# Patient Record
Sex: MALE | Race: BLACK OR AFRICAN AMERICAN | NOT HISPANIC OR LATINO | Employment: UNEMPLOYED | URBAN - METROPOLITAN AREA
[De-identification: names, ages, dates, MRNs, and addresses within clinical notes are randomized per-mention and may not be internally consistent; named-entity substitution may affect disease eponyms.]

---

## 2020-02-29 ENCOUNTER — HOSPITAL ENCOUNTER (EMERGENCY)
Facility: HOSPITAL | Age: 30
Discharge: HOME | End: 2020-02-29
Attending: EMERGENCY MEDICINE | Admitting: EMERGENCY MEDICINE
Payer: COMMERCIAL

## 2020-02-29 ENCOUNTER — APPOINTMENT (EMERGENCY)
Dept: RADIOLOGY | Facility: HOSPITAL | Age: 30
End: 2020-02-29
Payer: COMMERCIAL

## 2020-02-29 VITALS
SYSTOLIC BLOOD PRESSURE: 117 MMHG | RESPIRATION RATE: 18 BRPM | DIASTOLIC BLOOD PRESSURE: 86 MMHG | WEIGHT: 142.2 LBS | TEMPERATURE: 98.7 F | OXYGEN SATURATION: 100 % | HEART RATE: 87 BPM

## 2020-02-29 DIAGNOSIS — F22 PARANOIA (HCC): Primary | ICD-10-CM

## 2020-02-29 DIAGNOSIS — M54.50 LOW BACK PAIN: ICD-10-CM

## 2020-02-29 DIAGNOSIS — Z11.3 SCREEN FOR STD (SEXUALLY TRANSMITTED DISEASE): ICD-10-CM

## 2020-02-29 LAB
ALBUMIN SERPL BCP-MCNC: 3.9 G/DL (ref 3.5–5)
ALP SERPL-CCNC: 78 U/L (ref 46–116)
ALT SERPL W P-5'-P-CCNC: 14 U/L (ref 12–78)
AMPHETAMINES SERPL QL SCN: NEGATIVE
ANION GAP SERPL CALCULATED.3IONS-SCNC: 9 MMOL/L (ref 4–13)
AST SERPL W P-5'-P-CCNC: 13 U/L (ref 5–45)
BACTERIA UR QL AUTO: ABNORMAL /HPF
BARBITURATES UR QL: NEGATIVE
BASOPHILS # BLD AUTO: 0.01 THOUSANDS/ΜL (ref 0–0.1)
BASOPHILS NFR BLD AUTO: 0 % (ref 0–1)
BENZODIAZ UR QL: NEGATIVE
BILIRUB SERPL-MCNC: 0.5 MG/DL (ref 0.2–1)
BILIRUB UR QL STRIP: NEGATIVE
BUN SERPL-MCNC: 14 MG/DL (ref 5–25)
CALCIUM SERPL-MCNC: 9.2 MG/DL (ref 8.3–10.1)
CHLORIDE SERPL-SCNC: 102 MMOL/L (ref 100–108)
CLARITY UR: CLEAR
CO2 SERPL-SCNC: 29 MMOL/L (ref 21–32)
COCAINE UR QL: NEGATIVE
COLOR UR: YELLOW
CREAT SERPL-MCNC: 1.32 MG/DL (ref 0.6–1.3)
EOSINOPHIL # BLD AUTO: 0 THOUSAND/ΜL (ref 0–0.61)
EOSINOPHIL NFR BLD AUTO: 0 % (ref 0–6)
ERYTHROCYTE [DISTWIDTH] IN BLOOD BY AUTOMATED COUNT: 11.9 % (ref 11.6–15.1)
ETHANOL SERPL-MCNC: <3 MG/DL (ref 0–3)
GFR SERPL CREATININE-BSD FRML MDRD: 83 ML/MIN/1.73SQ M
GLUCOSE SERPL-MCNC: 87 MG/DL (ref 65–140)
GLUCOSE UR STRIP-MCNC: ABNORMAL MG/DL
HAV IGM SER QL: NORMAL
HBV CORE AB SER QL: NORMAL
HBV CORE IGM SER QL: NORMAL
HBV CORE IGM SER QL: NORMAL
HBV SURFACE AG SER QL: NORMAL
HBV SURFACE AG SER QL: NORMAL
HCT VFR BLD AUTO: 45.9 % (ref 36.5–49.3)
HCV AB SER QL: NORMAL
HCV AB SER QL: NORMAL
HGB BLD-MCNC: 16.2 G/DL (ref 12–17)
HGB UR QL STRIP.AUTO: NEGATIVE
HIV 1+2 AB+HIV1 P24 AG SERPL QL IA: NORMAL
HIV1 P24 AG SER QL: NORMAL
IMM GRANULOCYTES # BLD AUTO: 0.03 THOUSAND/UL (ref 0–0.2)
IMM GRANULOCYTES NFR BLD AUTO: 0 % (ref 0–2)
KETONES UR STRIP-MCNC: NEGATIVE MG/DL
LEUKOCYTE ESTERASE UR QL STRIP: NEGATIVE
LYMPHOCYTES # BLD AUTO: 0.56 THOUSANDS/ΜL (ref 0.6–4.47)
LYMPHOCYTES NFR BLD AUTO: 6 % (ref 14–44)
MCH RBC QN AUTO: 32.1 PG (ref 26.8–34.3)
MCHC RBC AUTO-ENTMCNC: 35.3 G/DL (ref 31.4–37.4)
MCV RBC AUTO: 91 FL (ref 82–98)
METHADONE UR QL: NEGATIVE
MONOCYTES # BLD AUTO: 0.45 THOUSAND/ΜL (ref 0.17–1.22)
MONOCYTES NFR BLD AUTO: 5 % (ref 4–12)
MUCOUS THREADS UR QL AUTO: ABNORMAL
NEUTROPHILS # BLD AUTO: 8.38 THOUSANDS/ΜL (ref 1.85–7.62)
NEUTS SEG NFR BLD AUTO: 89 % (ref 43–75)
NITRITE UR QL STRIP: NEGATIVE
NON-SQ EPI CELLS URNS QL MICRO: ABNORMAL /HPF
NRBC BLD AUTO-RTO: 0 /100 WBCS
OPIATES UR QL SCN: NEGATIVE
PCP UR QL: NEGATIVE
PH UR STRIP.AUTO: 5.5 [PH]
PLATELET # BLD AUTO: 158 THOUSANDS/UL (ref 149–390)
PMV BLD AUTO: 10.7 FL (ref 8.9–12.7)
POTASSIUM SERPL-SCNC: 4 MMOL/L (ref 3.5–5.3)
PROT SERPL-MCNC: 8.3 G/DL (ref 6.4–8.2)
PROT UR STRIP-MCNC: ABNORMAL MG/DL
RBC # BLD AUTO: 5.05 MILLION/UL (ref 3.88–5.62)
RBC #/AREA URNS AUTO: ABNORMAL /HPF
SODIUM SERPL-SCNC: 140 MMOL/L (ref 136–145)
SP GR UR STRIP.AUTO: >=1.03 (ref 1–1.03)
THC UR QL: POSITIVE
UROBILINOGEN UR QL STRIP.AUTO: 0.2 E.U./DL
WBC # BLD AUTO: 9.43 THOUSAND/UL (ref 4.31–10.16)
WBC #/AREA URNS AUTO: ABNORMAL /HPF

## 2020-02-29 PROCEDURE — 80074 ACUTE HEPATITIS PANEL: CPT | Performed by: EMERGENCY MEDICINE

## 2020-02-29 PROCEDURE — 99284 EMERGENCY DEPT VISIT MOD MDM: CPT | Performed by: EMERGENCY MEDICINE

## 2020-02-29 PROCEDURE — 85025 COMPLETE CBC W/AUTO DIFF WBC: CPT | Performed by: EMERGENCY MEDICINE

## 2020-02-29 PROCEDURE — 80053 COMPREHEN METABOLIC PANEL: CPT | Performed by: EMERGENCY MEDICINE

## 2020-02-29 PROCEDURE — 87491 CHLMYD TRACH DNA AMP PROBE: CPT | Performed by: EMERGENCY MEDICINE

## 2020-02-29 PROCEDURE — 80320 DRUG SCREEN QUANTALCOHOLS: CPT | Performed by: EMERGENCY MEDICINE

## 2020-02-29 PROCEDURE — 99285 EMERGENCY DEPT VISIT HI MDM: CPT

## 2020-02-29 PROCEDURE — 72100 X-RAY EXAM L-S SPINE 2/3 VWS: CPT

## 2020-02-29 PROCEDURE — 36415 COLL VENOUS BLD VENIPUNCTURE: CPT | Performed by: EMERGENCY MEDICINE

## 2020-02-29 PROCEDURE — 87806 HIV AG W/HIV1&2 ANTB W/OPTIC: CPT | Performed by: EMERGENCY MEDICINE

## 2020-02-29 PROCEDURE — 87591 N.GONORRHOEAE DNA AMP PROB: CPT | Performed by: EMERGENCY MEDICINE

## 2020-02-29 PROCEDURE — 86592 SYPHILIS TEST NON-TREP QUAL: CPT | Performed by: EMERGENCY MEDICINE

## 2020-02-29 PROCEDURE — 80307 DRUG TEST PRSMV CHEM ANLYZR: CPT | Performed by: EMERGENCY MEDICINE

## 2020-02-29 PROCEDURE — 86704 HEP B CORE ANTIBODY TOTAL: CPT | Performed by: EMERGENCY MEDICINE

## 2020-02-29 PROCEDURE — 81001 URINALYSIS AUTO W/SCOPE: CPT | Performed by: EMERGENCY MEDICINE

## 2020-02-29 RX ORDER — IBUPROFEN 400 MG/1
400 TABLET ORAL ONCE
Status: COMPLETED | OUTPATIENT
Start: 2020-02-29 | End: 2020-02-29

## 2020-02-29 RX ADMIN — IBUPROFEN 400 MG: 400 TABLET ORAL at 02:40

## 2020-02-29 NOTE — ED NOTES
CW placed call to [address] [place] [address], spoke w/Patrizia  CW inquiring on outcome of phone interview  As per Eleno Keita, pt has been accepted and will be picked up between 4:30-5:00     CW provided pt w/update  Pt is receptive to plan      TDS, CW

## 2020-02-29 NOTE — ED NOTES
CW has placed 2 calls to [facility] and spoke w/Patrizia  As per Lisseth Chapman, facility is trying to get in touch w/supervisor to receive okay to accept pt due to pt's home [facility]        TDS, CW

## 2020-02-29 NOTE — ED NOTES
Patient informed nurse that he does not want to go to [facility] anymore  Spoke with radha, Tripp Pereyra - states it is not considered a transfer, it is a discharge and if he wishes not to go then he does not have to  Per ed physician, he may leave as well  Discharge paperwork provided along with outpatient resources  Family at bedside and patient ambulated out of department, no apparent distress  [facility] made aware, spoke with kaity Payne, RN  02/29/20 999 [address], RN  02/29/20 5388

## 2020-02-29 NOTE — ED NOTES
CW spoke w/pt  Pt appears unsure of what to do  CW offered to send referral to CRF, pt is receptive to plan  CW sent referral to [address] [place] [address] for review      TDS, CW

## 2020-02-29 NOTE — ED NOTES
Pr Dr Yolanda Kaur pt does not need to change into blue paper scrubs or have the room cleared  Pt is calm and cooperative at this time        [address] [place] [address]  02/29/20 1236

## 2020-02-29 NOTE — ED NOTES
Pt presents to the ED referred by family as pt has been experiencing paranoid like feelings  Pt was at a cousin's birthday party celebration last evening and "felt cousin was going to harm him"  Pt cannot provide exact details and states, "I don't know how to explain it it just happens"  Pt resides alone, is unemployed, and has become more isolated and has less of a desire to go out  Pt admits to using THC daily, smokes cigarettes once in a while and drinks wine weekly  Pt does not take any meds and does not have any OP services at this time  Pt reports less of an appetite and has trouble falling asleep most nights  Pt states, "This has been going on since about the summer of 2018 but has been getting worse"  Pt denies any SI's / HI's and or AVH's  Pt maintains good eye contact, is forthcoming, and cooperative  CW discussed w/pt options of IP treatment and OP treatment  Pt wishes to discuss w/his mother first as to what the best option is  Pt reports no particular stressors or triggers and no ongoing medical conditions  CW will return to speak w/pt to develop plan  CW discussed w/pt a CRF as well, CW will contact [facility] to see if pt qualifies for admission to facility as he is a resident of [facility]  Pt has agreed to alllow CW to inquire       TDS, CW

## 2020-02-29 NOTE — ED NOTES
CW provided pt w/update  Pt is receptive to attending CRF and would prefer CRF opposed to IP treatment at this time  CW awaiting return call from CRF  As per Lavonne Marquez and Patric Bills, must obtain approval from their supervisor      MOHAMUD MERA

## 2020-02-29 NOTE — ED NOTES
CW spoke w/pt regarding CRF discussion outcome  At this time, pt is still unsure and is trying to obtain advice from Mother, brothers, and cousin  Pt reports, "My mother really didn't have too much to say so I'm waiting to see what my friend says"  CW provided Dr Rogelio Starkey w/update  CW advised pt, CW would return shortly  If pt wishes to be discharged home, CW will provide pt w/OP resources      TDS, CW

## 2020-02-29 NOTE — ED NOTES
CW received return call from [address] [place] [address]  As per [facility], facility will need to contact supervisor, Justyna Peterson, to inquire on a non-par agreement for placement at facility  [place] will return call to the writer w/outcome      TDS, CW

## 2020-02-29 NOTE — ED PROVIDER NOTES
History  Chief Complaint   Patient presents with    Psychiatric Evaluation     Pt was told by family members to come into the hospital because he has not been acting like himself, pt was at a party tonight and stated "I thought my cousing was going to kill me " Pt denies visual or verbal hallucinations, denies SI or HI  pt reports he has not been feeling like doing or being around others for about a year  Pt friend reports that pt has not been getting adequate sleep for about a month   Exposure to STD     Pt would like to get tested for STD's, denies having any symptoms  Pt believes he was exposed to an STD  Patient is a 38-year-old male with no known past medical, surgical or psychiatric history, presents to the emergency department with multiple complaints  Patient reports that he has been increasingly more paranoid in certain social situations for the past 1 year but it is gotten worse recently  Tonight he had a family gathering and he started feeling very paranoid just surrounded by family members without any reason to feel this way  He reports feeling as though his family was going to hurt him or kill him  He does admit to feeling more anxious as well over the past several months to year  He denies feeling depressed and denies any homicidal or  Suicidal ideation  He denies any auditory visual hallucinations  He denies any triggers or significant life changes  He does report losing his job about a month ago but does not think that is causing his current symptoms  He states most of the time he just wants to be left alone  He does admit to occasional marijuana use but denies any other recreational drug use  Patient also reports that for "a while" he has been getting midline lumbar back pain, mostly at night and associated with night sweats  Patient does not usually take any medication for the pain    Patient's friend who accompanied patient into the ED states that he also sometimes complains of difficulty urinating but patient reports this is not happened in a while and he denies any dysuria, difficulty urinating or change in frequency recently  Denies any hematuria or flank pain  Patient also requesting to be tested for STDs  He states he is sexually active with women but is not sure if he was exposed to any specific STD  He denies any current symptoms such as penile discharge, penile pain or genital lesions, testicular pain or swelling or urinary symptoms as stated above  He denies any recent fever, chills, headache, dizziness or near syncope, cough, URI symptoms, chest pain, palpitations, abdominal pain, nausea, vomiting, change in bowel habits, change in appetite, unexplained weight loss or weight gain, lateralizing weakness or paresthesia or other focal neurologic deficits  No significant family history of psychiatric disease  He thinks his aunt may have bipolar disorder but no family history of schizophrenia  History provided by:  Patient and friend   used: No        None       History reviewed  No pertinent past medical history  History reviewed  No pertinent surgical history  History reviewed  No pertinent family history  I have reviewed and agree with the history as documented  E-Cigarette/Vaping     E-Cigarette/Vaping Substances     Social History     Tobacco Use    Smoking status: Current Every Day Smoker     Packs/day: 0 25     Types: Cigars, Cigarettes    Smokeless tobacco: Never Used    Tobacco comment: Pt reports "once in a while smokes cigarettes usually [employer]"   Substance Use Topics    Alcohol use: Yes     Drinks per session: 1 or 2     Comment: Pt reports drinking wine weekly and may have a beer a day    Drug use: Yes     Types: Marijuana     Comment: Pt reports smoking daily       Review of Systems   Constitutional: Negative for appetite change, chills and fever          +Night sweats   HENT: Negative for congestion, ear pain, rhinorrhea and sore throat  Eyes: Negative for photophobia, pain and visual disturbance  Respiratory: Negative for cough and shortness of breath  Cardiovascular: Negative for chest pain and palpitations  Gastrointestinal: Negative for abdominal pain, constipation, diarrhea, nausea and vomiting  Genitourinary: Negative for difficulty urinating, discharge, dysuria, flank pain, frequency, genital sores, hematuria, penile pain, penile swelling, scrotal swelling and testicular pain  Musculoskeletal: Positive for back pain  Negative for neck pain and neck stiffness  Skin: Negative for color change, rash and wound  Allergic/Immunologic: Negative for immunocompromised state  Neurological: Negative for dizziness, seizures, syncope, weakness, light-headedness, numbness and headaches  Psychiatric/Behavioral: Positive for sleep disturbance  Negative for confusion, decreased concentration, dysphoric mood, hallucinations and suicidal ideas  The patient is nervous/anxious  +Paranoia   All other systems reviewed and are negative  Physical Exam  Physical Exam   Constitutional: He is oriented to person, place, and time  He appears well-developed and well-nourished  No distress  HENT:   Head: Normocephalic and atraumatic  Mouth/Throat: Oropharynx is clear and moist    Eyes: Pupils are equal, round, and reactive to light  Conjunctivae and EOM are normal    Neck: Normal range of motion  Neck supple  No JVD present  Cardiovascular: Normal rate, regular rhythm, normal heart sounds and intact distal pulses  Exam reveals no gallop and no friction rub  No murmur heard  Pulmonary/Chest: Effort normal and breath sounds normal  No respiratory distress  He has no wheezes  He has no rales  Abdominal: Soft  Bowel sounds are normal  He exhibits no distension  There is no tenderness  There is no rebound and no guarding  Musculoskeletal: Normal range of motion  He exhibits tenderness   He exhibits no edema    +Midline lower lumbar spine tenderness  Neurological: He is alert and oriented to person, place, and time  No gross motor or sensory deficits  Skin: Skin is warm and dry  No rash noted  He is not diaphoretic  No pallor  Psychiatric: His behavior is normal    Flat affect  Poor eye contact  Nursing note and vitals reviewed        Vital Signs  ED Triage Vitals   Temperature Pulse Respirations Blood Pressure SpO2   02/29/20 0229 02/29/20 0117 02/29/20 0117 02/29/20 0117 02/29/20 0117   98 7 °F (37 1 °C) (!) 126 20 140/87 98 %      Temp Source Heart Rate Source Patient Position - Orthostatic VS BP Location FiO2 (%)   02/29/20 0229 02/29/20 0117 02/29/20 0117 02/29/20 0117 --   Oral Monitor Sitting Right arm       Pain Score       02/29/20 0117       No Pain         Vitals:    02/29/20 0248 02/29/20 0700 02/29/20 1100 02/29/20 1500   BP: 133/80 119/67 105/59 117/86   BP Location: Right arm Right arm Right arm Right arm   Pulse: 92 73 67 87   Resp: 18 16 16 18   Temp:       TempSrc:       SpO2: 98% 99% 98% 100%   Weight:           Visual Acuity      ED Medications  Medications   ibuprofen (MOTRIN) tablet 400 mg (400 mg Oral Given 2/29/20 0240)       Diagnostic Studies  Results Reviewed     Procedure Component Value Units Date/Time    Hepatitis panel, acute [[id number]]  (Normal) Collected:  02/29/20 0247    Lab Status:  Final result Specimen:  Blood from Arm, Right Updated:  02/29/20 1303     Hepatitis B Surface Ag Non-reactive     Hep A IgM Non-reactive     Hepatitis C Ab Non-reactive     Hep B C IgM Non-reactive    Chronic Hepatitis Panel [[id number]]  (Normal) Collected:  02/29/20 0247    Lab Status:  Final result Specimen:  Blood from Arm, Right Updated:  02/29/20 1303     Hepatitis B Surface Ag Non-reactive     Hepatitis C Ab Non-reactive     Hep B C IgM Non-reactive     Hep B Core Total Ab Non-reactive    Urine Microscopic [[id number]]  (Abnormal) Collected:  02/29/20 0358    Lab Status:  Final result Specimen:  Urine, Other Updated:  02/29/20 0416     RBC, UA 0-1 /hpf      WBC, UA None Seen /hpf      Epithelial Cells Occasional /hpf      Bacteria, UA None Seen /hpf      MUCUS THREADS Occasional    Rapid drug screen, urine [[id number]]  (Abnormal) Collected:  02/29/20 0358    Lab Status:  Final result Specimen:  Urine, Catheter Updated:  02/29/20 0411     Amph/Meth UR Negative     Barbiturate Ur Negative     Benzodiazepine Urine Negative     Cocaine Urine Negative     Methadone Urine Negative     Opiate Urine Negative     PCP Ur Negative     THC Urine Positive    Narrative:       Presumptive report  If requested, specimen will be sent to reference lab for confirmation  FOR MEDICAL PURPOSES ONLY  IF CONFIRMATION NEEDED PLEASE CONTACT THE LAB WITHIN 5 DAYS  Drug Screen Cutoff Levels:  AMPHETAMINE/METHAMPHETAMINES  1000 ng/mL  BARBITURATES     200 ng/mL  BENZODIAZEPINES     200 ng/mL  COCAINE      300 ng/mL  METHADONE      300 ng/mL  OPIATES      300 ng/mL  PHENCYCLIDINE     25 ng/mL  THC       50 ng/mL      UA (URINE) with reflex to Scope [[id number]]  (Abnormal) Collected:  02/29/20 0358    Lab Status:  Final result Specimen:  Urine, Other Updated:  02/29/20 0406     Color, UA Yellow     Clarity, UA Clear     Specific Gravity, UA >=1 030     pH, UA 5 5     Leukocytes, UA Negative     Nitrite, UA Negative     Protein, UA Trace mg/dl      Glucose,  (1/10%) mg/dl      Ketones, UA Negative mg/dl      Urobilinogen, UA 0 2 E U /dl      Bilirubin, UA Negative     Blood, UA Negative    Chlamydia/GC amplified DNA by PCR [[id number]] Collected:  02/29/20 0358    Lab Status:   In process Specimen:  Urine, Other Updated:  02/29/20 0402    Rapid HIV 1/2 AB-AG Combo [[id number]]  (Normal) Collected:  02/29/20 0247    Lab Status:  Final result Specimen:  Blood from Arm, Right Updated:  02/29/20 0327     Rapid HIV 1 AND 2 Non-Reactive     HIV-1 P24 Ag Screen Non-Reactive    Narrative:       Negative for HIV-1 p24 Antigen  Negative for HIV-1 and/or HIV-2 Antibody      Comprehensive metabolic panel [[id number]]  (Abnormal) Collected:  02/29/20 0247    Lab Status:  Final result Specimen:  Blood from Arm, Right Updated:  02/29/20 0309     Sodium 140 mmol/L      Potassium 4 0 mmol/L      Chloride 102 mmol/L      CO2 29 mmol/L      ANION GAP 9 mmol/L      BUN 14 mg/dL      Creatinine 1 32 mg/dL      Glucose 87 mg/dL      Calcium 9 2 mg/dL      AST 13 U/L      ALT 14 U/L      Alkaline Phosphatase 78 U/L      Total Protein 8 3 g/dL      Albumin 3 9 g/dL      Total Bilirubin 0 50 mg/dL      eGFR 83 ml/min/1 73sq m     Narrative:       National Kidney Disease Foundation guidelines for Chronic Kidney Disease (CKD):     Stage 1 with normal or high GFR (GFR > 90 mL/min/1 73 square meters)    Stage 2 Mild CKD (GFR = 60-89 mL/min/1 73 square meters)    Stage 3A Moderate CKD (GFR = 45-59 mL/min/1 73 square meters)    Stage 3B Moderate CKD (GFR = 30-44 mL/min/1 73 square meters)    Stage 4 Severe CKD (GFR = 15-29 mL/min/1 73 square meters)    Stage 5 End Stage CKD (GFR <15 mL/min/1 73 square meters)  Note: GFR calculation is accurate only with a steady state creatinine    Ethanol [[id number]]  (Normal) Collected:  02/29/20 0247    Lab Status:  Final result Specimen:  Blood from Arm, Right Updated:  02/29/20 0305     Ethanol Lvl <3 mg/dL     CBC and differential [[id number]]  (Abnormal) Collected:  02/29/20 0247    Lab Status:  Final result Specimen:  Blood from Arm, Right Updated:  02/29/20 0253     WBC 9 43 Thousand/uL      RBC 5 05 Million/uL      Hemoglobin 16 2 g/dL      Hematocrit 45 9 %      MCV 91 fL      MCH 32 1 pg      MCHC 35 3 g/dL      RDW 11 9 %      MPV 10 7 fL      Platelets 181 Thousands/uL      nRBC 0 /100 WBCs      Neutrophils Relative 89 %      Immat GRANS % 0 %      Lymphocytes Relative 6 %      Monocytes Relative 5 %      Eosinophils Relative 0 %      Basophils Relative 0 %      Neutrophils Absolute 8 38 Thousands/µL Immature Grans Absolute 0 03 Thousand/uL      Lymphocytes Absolute 0 56 Thousands/µL      Monocytes Absolute 0 45 Thousand/µL      Eosinophils Absolute 0 00 Thousand/µL      Basophils Absolute 0 01 Thousands/µL     RPR [[id number]] Collected:  02/29/20 0247    Lab Status: In process Specimen:  Blood from Arm, Right Updated:  02/29/20 0251                 XR spine lumbar 2 or 3 views injury   ED Interpretation by Yimi Valdes DO (02/29 0322)   No acute osseous abnormality  Procedures  Procedures         ED Course  ED Course as of Feb 29 1607   Sat Feb 29, 2020   0320 No prior labs for comparison  GFR normal     Creatinine(!): 1 32   0321 WBC: 9 43   0400 Patient medically cleared for crisis evaluation  [id number] Signed patient out to Dr Pop Victor who will f/u crisis evaluation  MDM  Number of Diagnoses or Management Options  Diagnosis management comments: 47-year-old male presents to the ED primarily for psychiatric evaluation for increasing paranoia and anxiety recently  Patient also reports chronic back pain associated with occasional night sweats  He also states he would like to be tested for STDs  As far as STD screening, will test HIV, acute and chronic hepatitis panel, RPR and urine gonorrhea and chlamydia  I did explain the majority of these results will not come back tonight and we will call for positive results only  As far as the back pain and night sweats, will obtain an x-ray of the lumbar spine and check basic blood work including CBC and CMP  Most likely this is musculoskeletal however other considerations such as cancer considered  For the paranoia and anxiety, I offered patient to wait in the ED overnight to be evaluated by crisis worker in the morning and patient agreeable  He is not suicidal or homicidal and does not need one-to-one monitoring  Will check alcohol level and UDS         Amount and/or Complexity of Data Reviewed  Clinical lab tests: ordered and reviewed  Tests in the radiology section of CPT®: ordered and reviewed  Obtain history from someone other than the patient: yes  Independent visualization of images, tracings, or specimens: yes          Disposition  Final diagnoses:   Paranoia ([address] )   Screen for STD (sexually transmitted disease)   Low back pain     Time reflects when diagnosis was documented in both MDM as applicable and the Disposition within this note     Time User Action Codes Description Comment    2/29/2020  7:06 AM Gunner Cushing E Add [F22] Paranoia ([address] )     2/29/2020  7:07 AM Gunner Cushing E Add [Z11 3] Screen for STD (sexually transmitted disease)     2/29/2020  7:07 AM Gunner Cushing E Add [M54 5] Low back pain       ED Disposition     ED Disposition Condition Date/Time Comment    Discharge Stable Sat Feb 29, 2020  7:15 AM Star Bermeo discharge to home/self care  MD Documentation      Most Recent Value   Sending MD Dr Kamille Mauroing up With Specialties Details Why Contact Info Additional Information    Infolink  Call  To establish care with a primary care doctor [phone]       [facility] Emergency Department Emergency Medicine Go to  If symptoms worsen [address] [address] ED, [address] [place] [address], [id number]          Patient's Medications    No medications on file     No discharge procedures on file      PDMP Review     None          ED Provider  Electronically Signed by           Endy Bella DO  02/29/20 1221

## 2020-02-29 NOTE — ED NOTES
CW placed call to [address] [place] [address] and spoke w/Patrizia MALLORY inquired if CW to contact [facility] crisis office as well as an update  CW advised Patrizia, this writer has been awaiting a reply since 9:00am   Dorcas Wall and has sent a text to supervisor once again  Patrizia placed CW on hold while speaking w/supervisor at this time  As per Meek Dys to bring pt wireless phone to speak w/Patrizia at [facility]       TDS, CW

## 2020-02-29 NOTE — DISCHARGE INSTRUCTIONS
Back Pain   WHAT YOU NEED TO KNOW:   Back pain is common  It can be caused by many conditions, such as arthritis or the breakdown of spinal discs  Your risk for back pain is increased by injuries, lack of activity, or repeated bending and twisting  You may feel sore or stiff on one or both sides of your back  The pain may spread to your buttocks or thighs  DISCHARGE INSTRUCTIONS:   Medicines:   · NSAIDs  help decrease swelling and pain  This medicine is available with or without a doctor's order  NSAIDs can cause stomach bleeding or kidney problems in certain people  If you take blood thinner medicine, always ask your healthcare provider if NSAIDs are safe for you  Always read the medicine label and follow directions  · Acetaminophen  decreases pain  It is available without a doctor's order  Ask how much to take and how often to take it  Follow directions  Acetaminophen can cause liver damage if not taken correctly  · Prescription pain medicine  may be given  Ask your healthcare provider how to take this medicine safely  · Take your medicine as directed  Contact your healthcare provider if you think your medicine is not helping or if you have side effects  Tell him or her if you are allergic to any medicine  Keep a list of the medicines, vitamins, and herbs you take  Include the amounts, and when and why you take them  Bring the list or the pill bottles to follow-up visits  Carry your medicine list with you in case of an emergency  Follow up with your healthcare provider in 2 weeks, or as directed:  Write down your questions so you remember to ask them during your visits  How to manage your back pain:   · Apply ice  on your back or affected area for 15 to 20 minutes every hour or as directed  Use an ice pack, or put crushed ice in a plastic bag  Cover it with a towel  Ice helps prevent tissue damage and decreases pain      · Apply heat  on your back or affected area for 20 to 30 minutes every 2 hours for as many days as directed  Heat helps decrease pain and muscle spasms  · Stay active  as much as you can without causing more pain  Bed rest could make your back pain worse  Avoid heavy lifting until your pain is gone  Return to the emergency department if:   · You have pain, numbness, or weakness in one or both legs  · Your pain becomes so severe that you cannot walk  · You cannot control your urine or bowel movements  · You have severe back pain with chest pain  · You have severe back pain, nausea, and vomiting  · You have severe back pain that spreads to your side or genital area  Contact your healthcare provider if:   · You have back pain that does not get better with rest and pain medicine  · You have a fever  · You have pain that worsens when you are on your back or when you rest     · You have pain that worsens when you cough or sneeze  · You lose weight without trying  · You have questions or concerns about your condition or care  © 2017 [employer]  Information is for End User's use only and may not be sold, redistributed or otherwise used for commercial purposes  All illustrations and images included in CareNotes® are the copyrighted property of [employer]  or Sorin Ribeiro  The above information is an  only  It is not intended as medical advice for individual conditions or treatments  Talk to your doctor, nurse or pharmacist before following any medical regimen to see if it is safe and effective for you  Psychotic Disorder   WHAT YOU NEED TO KNOW:   A psychotic disorder is a medical condition that causes hallucinations and delusions  Hallucinations are seeing, hearing, tasting, or feeling things that are not real  Delusions are beliefs that something is real, true, or right when it is not  These false beliefs do not go away even if there is proof that they are not true   You may believe someone is spying on you, after you, or controlling your mind  You may also believe there is something wrong with how your body works  Schizophrenia and schizoaffective disorder are examples of psychotic disorders  DISCHARGE INSTRUCTIONS:   Call 911 if:   · You feel like you could harm yourself or someone else  Contact your healthcare provider if:   · Your symptoms do not improve  · You cannot make it to your next appointment  · You have new symptoms  · You have questions or concerns about your condition or care  Medicines  may be given to decrease your symptoms  You may need 1 or more medicines  You may need to take your medicine for several weeks before you begin to feel better  Tell your healthcare provider about any side effects or problems you have with your medicines  The type or amount of medicine may need to be changed  Get support: It may be difficult to cope with your illness  You may feel lonely, anxious, or depressed  It may help to join a support group  A support group lets you talk with others who have a mental illness  For information and more support visit:  · [facility]  [address]  [facility]  , [address] [place] [address] , [address] [place] [address]  Phone: [phone]  Phone: [phone]  Web Address: http://[employer]/  [employer]  Self-care:   · Do not drink alcohol or use illegal drugs  Alcohol and illegal drugs can make your symptoms worse  Ask your healthcare provider for information if you currently drink alcohol or use illegal drugs and need help to quit  · Do not smoke  Nicotine and other chemicals in cigarettes and cigars can cause lung damage  They can also decrease how well your medicine works  Ask your healthcare provider for information if you currently smoke and need help to quit  E-cigarettes or smokeless tobacco still contain nicotine  Talk to your healthcare provider before you use these products  · Exercise regularly  Exercise can help improve your mood and decrease symptoms  Ask about the best exercise plan for you  · Manage your stress  Stress can make your condition worse  Ask your healthcare provider for more information about practicing mindfulness and deep breathing exercises to help decrease your stress  You may learn other ways to manage stress during therapy  Follow up with your healthcare provider as directed:  Write down your questions so you remember to ask them during your visits  © 2017 [employer] Information is for End User's use only and may not be sold, redistributed or otherwise used for commercial purposes  All illustrations and images included in CareNotes® are the copyrighted property of [employer]  or Sorin Ribeiro  The above information is an  only  It is not intended as medical advice for individual conditions or treatments  Talk to your doctor, nurse or pharmacist before following any medical regimen to see if it is safe and effective for you

## 2020-02-29 NOTE — ED NOTES
CW placed call to [address] [place] [address], spoke w/Jayla  As per Steve Feldman, a referral can be sent and facility would determine if acceptance is possible since pt is a resident of [facility]  If facility cannot accept pt, facility would refer pt to appropriate county  CW will discuss w/pt      TDS, CW

## 2020-03-02 LAB
C TRACH DNA SPEC QL NAA+PROBE: NEGATIVE
N GONORRHOEA DNA SPEC QL NAA+PROBE: NEGATIVE
RPR SER QL: NORMAL

## 2021-04-22 ENCOUNTER — APPOINTMENT (EMERGENCY)
Dept: RADIOLOGY | Facility: HOSPITAL | Age: 31
End: 2021-04-22
Payer: COMMERCIAL

## 2021-04-22 ENCOUNTER — HOSPITAL ENCOUNTER (EMERGENCY)
Facility: HOSPITAL | Age: 31
Discharge: HOME | End: 2021-04-22
Attending: EMERGENCY MEDICINE | Admitting: EMERGENCY MEDICINE
Payer: COMMERCIAL

## 2021-04-22 VITALS
TEMPERATURE: 98 F | DIASTOLIC BLOOD PRESSURE: 84 MMHG | HEART RATE: 64 BPM | RESPIRATION RATE: 17 BRPM | OXYGEN SATURATION: 99 % | SYSTOLIC BLOOD PRESSURE: 110 MMHG

## 2021-04-22 DIAGNOSIS — M94.0 COSTOCHONDRITIS: ICD-10-CM

## 2021-04-22 DIAGNOSIS — J40 BRONCHITIS: ICD-10-CM

## 2021-04-22 DIAGNOSIS — R07.89 CHEST WALL PAIN: Primary | ICD-10-CM

## 2021-04-22 LAB
ALBUMIN SERPL BCP-MCNC: 3.4 G/DL (ref 3.5–5)
ALP SERPL-CCNC: 54 U/L (ref 46–116)
ALT SERPL W P-5'-P-CCNC: 15 U/L (ref 12–78)
ANION GAP SERPL CALCULATED.3IONS-SCNC: 7 MMOL/L (ref 4–13)
AST SERPL W P-5'-P-CCNC: 11 U/L (ref 5–45)
ATRIAL RATE: 71 BPM
ATRIAL RATE: 74 BPM
BASOPHILS # BLD AUTO: 0.01 THOUSANDS/ΜL (ref 0–0.1)
BASOPHILS NFR BLD AUTO: 0 % (ref 0–1)
BILIRUB SERPL-MCNC: 0.49 MG/DL (ref 0.2–1)
BUN SERPL-MCNC: 15 MG/DL (ref 5–25)
CALCIUM ALBUM COR SERPL-MCNC: 8.6 MG/DL (ref 8.3–10.1)
CALCIUM SERPL-MCNC: 8.1 MG/DL (ref 8.3–10.1)
CHLORIDE SERPL-SCNC: 106 MMOL/L (ref 100–108)
CO2 SERPL-SCNC: 28 MMOL/L (ref 21–32)
CREAT SERPL-MCNC: 1.16 MG/DL (ref 0.6–1.3)
D DIMER PPP FEU-MCNC: <0.27 UG/ML FEU
EOSINOPHIL # BLD AUTO: 0.01 THOUSAND/ΜL (ref 0–0.61)
EOSINOPHIL NFR BLD AUTO: 0 % (ref 0–6)
ERYTHROCYTE [DISTWIDTH] IN BLOOD BY AUTOMATED COUNT: 12.4 % (ref 11.6–15.1)
GFR SERPL CREATININE-BSD FRML MDRD: 96 ML/MIN/1.73SQ M
GLUCOSE SERPL-MCNC: 103 MG/DL (ref 65–140)
HCT VFR BLD AUTO: 42.7 % (ref 36.5–49.3)
HGB BLD-MCNC: 15.1 G/DL (ref 12–17)
IMM GRANULOCYTES # BLD AUTO: 0 THOUSAND/UL (ref 0–0.2)
IMM GRANULOCYTES NFR BLD AUTO: 0 % (ref 0–2)
LIPASE SERPL-CCNC: 46 U/L (ref 73–393)
LYMPHOCYTES # BLD AUTO: 0.98 THOUSANDS/ΜL (ref 0.6–4.47)
LYMPHOCYTES NFR BLD AUTO: 36 % (ref 14–44)
MAGNESIUM SERPL-MCNC: 1.7 MG/DL (ref 1.6–2.6)
MCH RBC QN AUTO: 32 PG (ref 26.8–34.3)
MCHC RBC AUTO-ENTMCNC: 35.4 G/DL (ref 31.4–37.4)
MCV RBC AUTO: 91 FL (ref 82–98)
MONOCYTES # BLD AUTO: 0.49 THOUSAND/ΜL (ref 0.17–1.22)
MONOCYTES NFR BLD AUTO: 18 % (ref 4–12)
NEUTROPHILS # BLD AUTO: 1.23 THOUSANDS/ΜL (ref 1.85–7.62)
NEUTS SEG NFR BLD AUTO: 46 % (ref 43–75)
NRBC BLD AUTO-RTO: 0 /100 WBCS
P AXIS: 82 DEGREES
P AXIS: 86 DEGREES
PLATELET # BLD AUTO: 153 THOUSANDS/UL (ref 149–390)
PMV BLD AUTO: 10.4 FL (ref 8.9–12.7)
POTASSIUM SERPL-SCNC: 3.9 MMOL/L (ref 3.5–5.3)
PR INTERVAL: 140 MS
PR INTERVAL: 140 MS
PROT SERPL-MCNC: 7.1 G/DL (ref 6.4–8.2)
QRS AXIS: 83 DEGREES
QRS AXIS: 83 DEGREES
QRSD INTERVAL: 78 MS
QRSD INTERVAL: 78 MS
QT INTERVAL: 348 MS
QT INTERVAL: 348 MS
QTC INTERVAL: 378 MS
QTC INTERVAL: 386 MS
RBC # BLD AUTO: 4.72 MILLION/UL (ref 3.88–5.62)
SODIUM SERPL-SCNC: 141 MMOL/L (ref 136–145)
T WAVE AXIS: 70 DEGREES
T WAVE AXIS: 74 DEGREES
TROPONIN I SERPL-MCNC: 0.03 NG/ML
VENTRICULAR RATE: 71 BPM
VENTRICULAR RATE: 74 BPM
WBC # BLD AUTO: 2.72 THOUSAND/UL (ref 4.31–10.16)

## 2021-04-22 PROCEDURE — 85025 COMPLETE CBC W/AUTO DIFF WBC: CPT | Performed by: EMERGENCY MEDICINE

## 2021-04-22 PROCEDURE — 99284 EMERGENCY DEPT VISIT MOD MDM: CPT | Performed by: EMERGENCY MEDICINE

## 2021-04-22 PROCEDURE — 99285 EMERGENCY DEPT VISIT HI MDM: CPT

## 2021-04-22 PROCEDURE — 85379 FIBRIN DEGRADATION QUANT: CPT | Performed by: EMERGENCY MEDICINE

## 2021-04-22 PROCEDURE — 83735 ASSAY OF MAGNESIUM: CPT | Performed by: EMERGENCY MEDICINE

## 2021-04-22 PROCEDURE — 84484 ASSAY OF TROPONIN QUANT: CPT | Performed by: EMERGENCY MEDICINE

## 2021-04-22 PROCEDURE — 83690 ASSAY OF LIPASE: CPT | Performed by: EMERGENCY MEDICINE

## 2021-04-22 PROCEDURE — 80053 COMPREHEN METABOLIC PANEL: CPT | Performed by: EMERGENCY MEDICINE

## 2021-04-22 PROCEDURE — 36415 COLL VENOUS BLD VENIPUNCTURE: CPT | Performed by: EMERGENCY MEDICINE

## 2021-04-22 PROCEDURE — 96374 THER/PROPH/DIAG INJ IV PUSH: CPT

## 2021-04-22 PROCEDURE — 71046 X-RAY EXAM CHEST 2 VIEWS: CPT

## 2021-04-22 PROCEDURE — 93010 ELECTROCARDIOGRAM REPORT: CPT | Performed by: INTERNAL MEDICINE

## 2021-04-22 PROCEDURE — 93005 ELECTROCARDIOGRAM TRACING: CPT

## 2021-04-22 RX ORDER — NAPROXEN 500 MG/1
500 TABLET ORAL 2 TIMES DAILY WITH MEALS
Qty: 20 TABLET | Refills: 0 | Status: SHIPPED | OUTPATIENT
Start: 2021-04-22 | End: 2021-06-02

## 2021-04-22 RX ORDER — KETOROLAC TROMETHAMINE 30 MG/ML
15 INJECTION, SOLUTION INTRAMUSCULAR; INTRAVENOUS ONCE
Status: COMPLETED | OUTPATIENT
Start: 2021-04-22 | End: 2021-04-22

## 2021-04-22 RX ORDER — ALBUTEROL SULFATE 90 UG/1
2 AEROSOL, METERED RESPIRATORY (INHALATION) ONCE
Status: COMPLETED | OUTPATIENT
Start: 2021-04-22 | End: 2021-04-22

## 2021-04-22 RX ORDER — METHYLPREDNISOLONE 4 MG/1
TABLET ORAL
Qty: 21 TABLET | Refills: 0 | Status: SHIPPED | OUTPATIENT
Start: 2021-04-22 | End: 2021-06-02

## 2021-04-22 RX ADMIN — KETOROLAC TROMETHAMINE 15 MG: 30 INJECTION, SOLUTION INTRAMUSCULAR at 12:02

## 2021-04-22 RX ADMIN — ALBUTEROL SULFATE 2 PUFF: 90 AEROSOL, METERED RESPIRATORY (INHALATION) at 13:39

## 2021-04-22 NOTE — ED PROVIDER NOTES
History  Chief Complaint   Patient presents with    Chest Pain     Reports L sided stabbing chest pain x 3 days, hurts worse with deep breath  Denies any other symptoms  78-year-old male presents with chest pain  He states that he is a stabbing left-sided chest pain for the past 3 days  This is associated with shortness of breath and pain with deep inhalation  Also associated with positional pain, increased pain when he leans over  No fevers, chills, cough  He does not have COVID symptoms or known COVID exposure  No personal cardiac history  No history of similar in the past           None       History reviewed  No pertinent past medical history  History reviewed  No pertinent surgical history  History reviewed  No pertinent family history  I have reviewed and agree with the history as documented  E-Cigarette/Vaping    E-Cigarette Use Never User      E-Cigarette/Vaping Substances    Nicotine No     THC No     CBD No     Flavoring No     Other No     Unknown No      Social History     Tobacco Use    Smoking status: Current Every Day Smoker     Packs/day: 0 25     Types: Cigars, Cigarettes    Smokeless tobacco: Never Used    Tobacco comment: Pt reports "once in a while smokes cigarettes usually Black and Mild's"   Substance Use Topics    Alcohol use: Yes     Drinks per session: 1 or 2     Comment: Pt reports drinking wine weekly and may have a beer a day    Drug use: Yes     Types: Marijuana     Comment: Pt reports smoking daily       Review of Systems   Constitutional: Negative for chills, diaphoresis, fatigue and fever  HENT: Negative for congestion and rhinorrhea  Respiratory: Positive for shortness of breath  Negative for cough, chest tightness, wheezing and stridor  Cardiovascular: Positive for chest pain (Stabbing L sided )  Negative for palpitations and leg swelling  Gastrointestinal: Negative for abdominal pain, nausea and vomiting     Musculoskeletal: Negative for back pain and neck pain  Skin: Negative for wound  Neurological: Negative for dizziness and headaches  Physical Exam  Physical Exam  Vitals signs reviewed  Constitutional:       General: He is not in acute distress  Appearance: He is well-developed  He is not ill-appearing, toxic-appearing or diaphoretic  HENT:      Head: Normocephalic and atraumatic  Eyes:      General: No scleral icterus  Right eye: No discharge  Left eye: No discharge  Conjunctiva/sclera: Conjunctivae normal       Pupils: Pupils are equal, round, and reactive to light  Neck:      Musculoskeletal: Normal range of motion and neck supple  Vascular: No JVD  Cardiovascular:      Rate and Rhythm: Normal rate and regular rhythm  Heart sounds: Normal heart sounds  No murmur  No friction rub  No gallop  Pulmonary:      Effort: Pulmonary effort is normal  No respiratory distress  Breath sounds: Normal breath sounds  No decreased breath sounds, wheezing, rhonchi or rales  Chest:      Chest wall: Tenderness (anterior) present  No crepitus  Comments: Positional pain - pain w leaning forward/back  Abdominal:      General: Bowel sounds are normal  There is no distension  Palpations: Abdomen is soft  Tenderness: There is no abdominal tenderness  There is no guarding or rebound  Musculoskeletal: Normal range of motion  General: No tenderness or deformity  Right lower leg: He exhibits no tenderness  No edema  Left lower leg: He exhibits no tenderness  No edema  Skin:     General: Skin is warm and dry  Coloration: Skin is not pale  Findings: No erythema or rash  Neurological:      Mental Status: He is alert and oriented to person, place, and time  Cranial Nerves: No cranial nerve deficit     Psychiatric:         Behavior: Behavior normal          Vital Signs  ED Triage Vitals   Temperature Pulse Respirations Blood Pressure SpO2   04/22/21 1122 04/22/21 1122 04/22/21 1122 04/22/21 1122 04/22/21 1122   98 °F (36 7 °C) 98 17 135/91 99 %      Temp Source Heart Rate Source Patient Position - Orthostatic VS BP Location FiO2 (%)   04/22/21 1122 04/22/21 1122 04/22/21 1122 04/22/21 1122 --   Temporal Monitor Sitting Left arm       Pain Score       04/22/21 1202       5           Vitals:    04/22/21 1122 04/22/21 1230 04/22/21 1300   BP: 135/91 116/81 110/84   Pulse: 98 59 64   Patient Position - Orthostatic VS: Sitting Lying Lying         Visual Acuity      ED Medications  Medications   ketorolac (TORADOL) injection 15 mg (15 mg Intravenous Given 4/22/21 1202)   albuterol (PROVENTIL HFA,VENTOLIN HFA) inhaler 2 puff (2 puffs Inhalation Given 4/22/21 1339)       Diagnostic Studies  Results Reviewed     Procedure Component Value Units Date/Time    D-dimer, quantitative [[id number]]  (Normal) Collected: 04/22/21 1153    Lab Status: Final result Specimen: Blood from Arm, Left Updated: 04/22/21 1230     D-Dimer, Quant <0 27 ug/ml FEU     Comprehensive metabolic panel [[id number]]  (Abnormal) Collected: 04/22/21 1153    Lab Status: Final result Specimen: Blood from Arm, Left Updated: 04/22/21 1222     Sodium 141 mmol/L      Potassium 3 9 mmol/L      Chloride 106 mmol/L      CO2 28 mmol/L      ANION GAP 7 mmol/L      BUN 15 mg/dL      Creatinine 1 16 mg/dL      Glucose 103 mg/dL      Calcium 8 1 mg/dL      Corrected Calcium 8 6 mg/dL      AST 11 U/L      ALT 15 U/L      Alkaline Phosphatase 54 U/L      Total Protein 7 1 g/dL      Albumin 3 4 g/dL      Total Bilirubin 0 49 mg/dL      eGFR 96 ml/min/1 73sq m     Narrative:      Katerina guidelines for Chronic Kidney Disease (CKD):     Stage 1 with normal or high GFR (GFR > 90 mL/min/1 73 square meters)    Stage 2 Mild CKD (GFR = 60-89 mL/min/1 73 square meters)    Stage 3A Moderate CKD (GFR = 45-59 mL/min/1 73 square meters)    Stage 3B Moderate CKD (GFR = 30-44 mL/min/1 73 square meters)   Stage 4 Severe CKD (GFR = 15-29 mL/min/1 73 square meters)    Stage 5 End Stage CKD (GFR <15 mL/min/1 73 square meters)  Note: GFR calculation is accurate only with a steady state creatinine    Magnesium [[id number]]  (Normal) Collected: 04/22/21 1153    Lab Status: Final result Specimen: Blood from Arm, Left Updated: 04/22/21 1222     Magnesium 1 7 mg/dL     Lipase [[id number]]  (Abnormal) Collected: 04/22/21 1153    Lab Status: Final result Specimen: Blood from Arm, Left Updated: 04/22/21 1222     Lipase 46 u/L     Troponin I [[id number]]  (Normal) Collected: 04/22/21 1153    Lab Status: Final result Specimen: Blood from Arm, Left Updated: 04/22/21 1217     Troponin I 0 03 ng/mL     CBC and differential [[id number]]  (Abnormal) Collected: 04/22/21 1153    Lab Status: Final result Specimen: Blood from Arm, Left Updated: 04/22/21 1159     WBC 2 72 Thousand/uL      RBC 4 72 Million/uL      Hemoglobin 15 1 g/dL      Hematocrit 42 7 %      MCV 91 fL      MCH 32 0 pg      MCHC 35 4 g/dL      RDW 12 4 %      MPV 10 4 fL      Platelets 628 Thousands/uL      nRBC 0 /100 WBCs      Neutrophils Relative 46 %      Immat GRANS % 0 %      Lymphocytes Relative 36 %      Monocytes Relative 18 %      Eosinophils Relative 0 %      Basophils Relative 0 %      Neutrophils Absolute 1 23 Thousands/µL      Immature Grans Absolute 0 00 Thousand/uL      Lymphocytes Absolute 0 98 Thousands/µL      Monocytes Absolute 0 49 Thousand/µL      Eosinophils Absolute 0 01 Thousand/µL      Basophils Absolute 0 01 Thousands/µL                  XR chest 2 views   Final Result by Anupam Mcdonald MD (04/22 6417)      No acute cardiopulmonary disease  Workstation performed: [id number]                    Procedures  Procedures   EKG, no STEMI, no concerning arrhythmia  Appears consistent with benign early repolarization  ED Course  ED Course as of May 10 1937   Thu Apr 22, 2021   1133 EKG appears consistent w benign early repol         [address] [facility] D-Dimer, Quant: <0 27   1318 Patient feels well aft er toradol - pericarditis vs costochondritis vs bronchitis  Will tx w albuterol inhaler, steroids, antiinflammatory  Will give OP PCP fu                                              MDM  Number of Diagnoses or Management Options  Bronchitis:   Chest wall pain:   Costochondritis:   Diagnosis management comments: CP, pain with deep inhalation  Low concern for ACS, pulmonary embolism  Likely bronchitis, pericarditis, costochondritis  - dimer is normal, ACS workup was normal     Patient feels improved after Toradol, improved after albuterol inhalation  Costochondritis versus bronchitis versus pericarditis  Patient will be treated with anti-inflammatory medication, medication for reactive airway disease, steroids and anti-inflammatory medications  Patient given return precautions in case of signs of cardiopulmonary disease  Follow-up outpatient  Patient comfortable plan  Amount and/or Complexity of Data Reviewed  Clinical lab tests: ordered and reviewed  Tests in the radiology section of CPT®: ordered and reviewed        Disposition  Final diagnoses:   Chest wall pain   Costochondritis   Bronchitis     Time reflects when diagnosis was documented in both MDM as applicable and the Disposition within this note     Time User Action Codes Description Comment    4/22/2021  1:20 PM Karma Boyce Add [R07 89] Chest wall pain     4/22/2021  1:20 PM Satish Guerrero Add [M94 0] Costochondritis     4/22/2021  1:20 PM Karma Boyce Add [J40] Bronchitis       ED Disposition     ED Disposition Condition Date/Time Comment    Discharge Stable u Apr 22, 2021  1:20 PM [facility] discharge to home/self care              Follow-up Information     Follow up With Specialties Details Why Contact Info Additional [address] [facility] Emergency Department Emergency Medicine  If symptoms worsen [place] [address]  [address] [facility] Emergency Department, [address], [place],  Madeleine Thomas MD Internal Medicine Schedule an appointment as soon as possible for a visit  For follow up to ensure improvement, and for further testing and treatment as needed [address] [facility] [address] [place]  [phone]             Discharge Medication List as of 4/22/2021  1:23 PM      START taking these medications    Details   methylPREDNISolone 4 MG tablet therapy pack Use as directed on package, Print      naproxen (NAPROSYN) 500 mg tablet Take 1 tablet (500 mg total) by mouth 2 (two) times a day with meals Take scheduled for 5 days, then as needed for pain control , Starting Thu 4/22/2021, Print           No discharge procedures on file      PDMP Review     None          ED Provider  Electronically Signed by           Yessenia Erwin DO  05/10/21 2531

## 2021-04-22 NOTE — Clinical Note
Ryan Wallis was seen and treated in our emergency department on 4/22/2021  Diagnosis:     Marty Lambert  may return to work on return date  He may return on this date: 04/23/2021         If you have any questions or concerns, please don't hesitate to call        Cindy Milner RN    ______________________________           _______________          _______________  [facility] Representative                              Date                                Time

## 2021-05-25 ENCOUNTER — HOSPITAL ENCOUNTER (EMERGENCY)
Facility: HOSPITAL | Age: 31
Discharge: HOME | End: 2021-05-25
Attending: EMERGENCY MEDICINE | Admitting: EMERGENCY MEDICINE
Payer: COMMERCIAL

## 2021-05-25 ENCOUNTER — APPOINTMENT (EMERGENCY)
Dept: ULTRASOUND IMAGING | Facility: HOSPITAL | Age: 31
End: 2021-05-25
Payer: COMMERCIAL

## 2021-05-25 VITALS
OXYGEN SATURATION: 98 % | TEMPERATURE: 98.8 F | RESPIRATION RATE: 20 BRPM | HEART RATE: 71 BPM | DIASTOLIC BLOOD PRESSURE: 81 MMHG | SYSTOLIC BLOOD PRESSURE: 131 MMHG

## 2021-05-25 DIAGNOSIS — R10.13 RECURRENT EPIGASTRIC ABDOMINAL PAIN: Primary | ICD-10-CM

## 2021-05-25 LAB
ALBUMIN SERPL BCP-MCNC: 3.5 G/DL (ref 3.5–5)
ALP SERPL-CCNC: 62 U/L (ref 46–116)
ALT SERPL W P-5'-P-CCNC: 16 U/L (ref 12–78)
ANION GAP SERPL CALCULATED.3IONS-SCNC: 6 MMOL/L (ref 4–13)
AST SERPL W P-5'-P-CCNC: 10 U/L (ref 5–45)
BASOPHILS # BLD AUTO: 0.01 THOUSANDS/ΜL (ref 0–0.1)
BASOPHILS NFR BLD AUTO: 0 % (ref 0–1)
BILIRUB SERPL-MCNC: 0.44 MG/DL (ref 0.2–1)
BUN SERPL-MCNC: 16 MG/DL (ref 5–25)
CALCIUM SERPL-MCNC: 8.4 MG/DL (ref 8.3–10.1)
CHLORIDE SERPL-SCNC: 106 MMOL/L (ref 100–108)
CO2 SERPL-SCNC: 30 MMOL/L (ref 21–32)
CREAT SERPL-MCNC: 1.23 MG/DL (ref 0.6–1.3)
EOSINOPHIL # BLD AUTO: 0.02 THOUSAND/ΜL (ref 0–0.61)
EOSINOPHIL NFR BLD AUTO: 1 % (ref 0–6)
ERYTHROCYTE [DISTWIDTH] IN BLOOD BY AUTOMATED COUNT: 12.3 % (ref 11.6–15.1)
GFR SERPL CREATININE-BSD FRML MDRD: 89 ML/MIN/1.73SQ M
GLUCOSE SERPL-MCNC: 116 MG/DL (ref 65–140)
HCT VFR BLD AUTO: 42.3 % (ref 36.5–49.3)
HGB BLD-MCNC: 15.1 G/DL (ref 12–17)
IMM GRANULOCYTES # BLD AUTO: 0.01 THOUSAND/UL (ref 0–0.2)
IMM GRANULOCYTES NFR BLD AUTO: 0 % (ref 0–2)
LIPASE SERPL-CCNC: 61 U/L (ref 73–393)
LYMPHOCYTES # BLD AUTO: 1.01 THOUSANDS/ΜL (ref 0.6–4.47)
LYMPHOCYTES NFR BLD AUTO: 29 % (ref 14–44)
MCH RBC QN AUTO: 32.4 PG (ref 26.8–34.3)
MCHC RBC AUTO-ENTMCNC: 35.7 G/DL (ref 31.4–37.4)
MCV RBC AUTO: 91 FL (ref 82–98)
MONOCYTES # BLD AUTO: 0.48 THOUSAND/ΜL (ref 0.17–1.22)
MONOCYTES NFR BLD AUTO: 14 % (ref 4–12)
NEUTROPHILS # BLD AUTO: 1.97 THOUSANDS/ΜL (ref 1.85–7.62)
NEUTS SEG NFR BLD AUTO: 56 % (ref 43–75)
NRBC BLD AUTO-RTO: 0 /100 WBCS
PLATELET # BLD AUTO: 168 THOUSANDS/UL (ref 149–390)
PMV BLD AUTO: 10.5 FL (ref 8.9–12.7)
POTASSIUM SERPL-SCNC: 3.8 MMOL/L (ref 3.5–5.3)
PROT SERPL-MCNC: 7.2 G/DL (ref 6.4–8.2)
RBC # BLD AUTO: 4.66 MILLION/UL (ref 3.88–5.62)
SODIUM SERPL-SCNC: 142 MMOL/L (ref 136–145)
WBC # BLD AUTO: 3.5 THOUSAND/UL (ref 4.31–10.16)

## 2021-05-25 PROCEDURE — 76705 ECHO EXAM OF ABDOMEN: CPT

## 2021-05-25 PROCEDURE — 99284 EMERGENCY DEPT VISIT MOD MDM: CPT

## 2021-05-25 PROCEDURE — 99284 EMERGENCY DEPT VISIT MOD MDM: CPT | Performed by: PHYSICIAN ASSISTANT

## 2021-05-25 PROCEDURE — 83690 ASSAY OF LIPASE: CPT | Performed by: PHYSICIAN ASSISTANT

## 2021-05-25 PROCEDURE — 80053 COMPREHEN METABOLIC PANEL: CPT | Performed by: PHYSICIAN ASSISTANT

## 2021-05-25 PROCEDURE — 85025 COMPLETE CBC W/AUTO DIFF WBC: CPT | Performed by: PHYSICIAN ASSISTANT

## 2021-05-25 PROCEDURE — 36415 COLL VENOUS BLD VENIPUNCTURE: CPT | Performed by: PHYSICIAN ASSISTANT

## 2021-05-25 NOTE — DISCHARGE INSTRUCTIONS
Take Pepcid 20mg twice a day as needed for acid reflux  Please call tomorrow to schedule a follow-up with gastroenterology  Return to the ER with any worsening symptoms

## 2021-05-25 NOTE — Clinical Note
Shaniqua La was seen and treated in our emergency department on 5/25/2021  Diagnosis:     Milderd Million  may return to work on return date  He may return on this date: 05/26/2021         If you have any questions or concerns, please don't hesitate to call        Denny Wright PA-C    ______________________________           _______________          _______________  Hospital Representative                              Date                                Time

## 2021-05-25 NOTE — ED PROVIDER NOTES
History  Chief Complaint   Patient presents with    Abdominal Pain     Pt reports history of adominal pain, has been hurting "for about a year" denies CP or SOB     31yo male who is otherwise healthy presenting for evaluation of abdominal pain that has been intermittent over the past several months  Pain is located in the epigastric region and radiates to the RUQ  He describes the pain as a "spasm" that comes and goes  Patient denies any know trigger to the pain  It usually lasts about a week before resolving and seems to occur every few weeks  His symptoms were worsening today so he decided to get checked out  While driving here, his symptoms spontaneously improved  Patient reports being under a lot of stress recently  He used an OTC medication for acid reflux several months ago which seemed to improve his discomfort at that time  Patient is otherwise asymptomatic and denies any fevers, chills, nausea, vomiting, diarrhea, constipation, dysuria, chest pain, weight loss  No previous abdominal surgeries  No previous history of PUD  History provided by:  Patient   used: No    Abdominal Pain  Pain location:  Epigastric  Pain quality comment:  Spasm  Pain radiates to:  RUQ  Pain severity:  Moderate  Onset quality:  Gradual  Timing:  Intermittent  Progression:  Partially resolved  Chronicity:  Recurrent  Context: not suspicious food intake and not trauma    Relieved by:  Nothing  Worsened by:  Nothing  Ineffective treatments:  None tried  Associated symptoms: no anorexia, no belching, no chest pain, no chills, no constipation, no cough, no diarrhea, no dysuria, no fatigue, no fever, no hematuria, no nausea, no shortness of breath and no vomiting        Prior to Admission Medications   Prescriptions Last Dose Informant Patient Reported? Taking?    methylPREDNISolone 4 MG tablet therapy pack   No No   Sig: Use as directed on package   naproxen (NAPROSYN) 500 mg tablet   No No   Sig: Take 1 tablet (500 mg total) by mouth 2 (two) times a day with meals Take scheduled for 5 days, then as needed for pain control  Facility-Administered Medications: None       History reviewed  No pertinent past medical history  History reviewed  No pertinent surgical history  History reviewed  No pertinent family history  I have reviewed and agree with the history as documented  E-Cigarette/Vaping    E-Cigarette Use Never User      E-Cigarette/Vaping Substances    Nicotine No     THC No     CBD No     Flavoring No     Other No     Unknown No      Social History     Tobacco Use    Smoking status: Current Every Day Smoker     Packs/day: 0 25     Types: Cigars, Cigarettes    Smokeless tobacco: Never Used    Tobacco comment: Pt reports "once in a while smokes cigarettes usually Black and Mild's"   Substance Use Topics    Alcohol use: Yes     Drinks per session: 1 or 2     Comment: Pt reports drinking wine weekly and may have a beer a day    Drug use: Yes     Types: Marijuana     Comment: Pt reports smoking daily       Review of Systems   Constitutional: Negative for chills, fatigue and fever  Eyes: Negative for discharge and redness  Respiratory: Negative for cough and shortness of breath  Cardiovascular: Negative for chest pain  Gastrointestinal: Positive for abdominal pain  Negative for anorexia, constipation, diarrhea, nausea and vomiting  Genitourinary: Negative for dysuria and hematuria  Musculoskeletal: Negative for neck pain and neck stiffness  Skin: Negative for color change and rash  Neurological: Negative for seizures and syncope  Psychiatric/Behavioral: Negative for confusion  The patient is not nervous/anxious  All other systems reviewed and are negative  Physical Exam  Physical Exam  Vitals signs and nursing note reviewed  Constitutional:       General: He is not in acute distress  Appearance: He is well-developed  He is not diaphoretic  Comments:  Well appearing, no acute distress   HENT:      Head: Normocephalic and atraumatic  Right Ear: External ear normal       Left Ear: External ear normal    Eyes:      General: No scleral icterus  Right eye: No discharge  Left eye: No discharge  Conjunctiva/sclera: Conjunctivae normal    Neck:      Musculoskeletal: Normal range of motion and neck supple  Cardiovascular:      Rate and Rhythm: Normal rate and regular rhythm  Heart sounds: Normal heart sounds  No murmur  Pulmonary:      Effort: Pulmonary effort is normal  No respiratory distress  Breath sounds: Normal breath sounds  No stridor  No wheezing or rales  Abdominal:      General: Bowel sounds are normal  There is no distension  Palpations: Abdomen is soft  Tenderness: There is abdominal tenderness in the epigastric area  There is no guarding  Comments: Mild tenderness in epigastric region  No signs of peritonitis  Negative Arriola's sign  Musculoskeletal: Normal range of motion  General: No deformity  Skin:     General: Skin is warm and dry  Neurological:      General: No focal deficit present  Mental Status: He is alert  He is not disoriented  GCS: GCS eye subscore is 4  GCS verbal subscore is 5  GCS motor subscore is 6     Psychiatric:         Behavior: Behavior normal          Vital Signs  ED Triage Vitals [05/25/21 1602]   Temperature Pulse Respirations Blood Pressure SpO2   98 8 °F (37 1 °C) 94 18 130/63 98 %      Temp Source Heart Rate Source Patient Position - Orthostatic VS BP Location FiO2 (%)   Oral Monitor Lying Right arm --      Pain Score       --           Vitals:    05/25/21 1602 05/25/21 1700 05/25/21 1830   BP: 130/63 114/64 131/81   Pulse: 94 75 71   Patient Position - Orthostatic VS: Lying Lying Sitting         Visual Acuity      ED Medications  Medications - No data to display    Diagnostic Studies  Results Reviewed     Procedure Component Value Units Date/Time Comprehensive metabolic panel [[id number]] Collected: 05/25/21 1615    Lab Status: Final result Specimen: Blood from Arm, Left Updated: 05/25/21 1638     Sodium 142 mmol/L      Potassium 3 8 mmol/L      Chloride 106 mmol/L      CO2 30 mmol/L      ANION GAP 6 mmol/L      BUN 16 mg/dL      Creatinine 1 23 mg/dL      Glucose 116 mg/dL      Calcium 8 4 mg/dL      AST 10 U/L      ALT 16 U/L      Alkaline Phosphatase 62 U/L      Total Protein 7 2 g/dL      Albumin 3 5 g/dL      Total Bilirubin 0 44 mg/dL      eGFR 89 ml/min/1 73sq m     Narrative:      National Kidney Disease Foundation guidelines for Chronic Kidney Disease (CKD):     Stage 1 with normal or high GFR (GFR > 90 mL/min/1 73 square meters)    Stage 2 Mild CKD (GFR = 60-89 mL/min/1 73 square meters)    Stage 3A Moderate CKD (GFR = 45-59 mL/min/1 73 square meters)    Stage 3B Moderate CKD (GFR = 30-44 mL/min/1 73 square meters)    Stage 4 Severe CKD (GFR = 15-29 mL/min/1 73 square meters)    Stage 5 End Stage CKD (GFR <15 mL/min/1 73 square meters)  Note: GFR calculation is accurate only with a steady state creatinine    Lipase [[id number]]  (Abnormal) Collected: 05/25/21 1615    Lab Status: Final result Specimen: Blood from Arm, Left Updated: 05/25/21 1638     Lipase 61 u/L     CBC and differential [[id number]]  (Abnormal) Collected: 05/25/21 1615    Lab Status: Final result Specimen: Blood from Arm, Left Updated: 05/25/21 1621     WBC 3 50 Thousand/uL      RBC 4 66 Million/uL      Hemoglobin 15 1 g/dL      Hematocrit 42 3 %      MCV 91 fL      MCH 32 4 pg      MCHC 35 7 g/dL      RDW 12 3 %      MPV 10 5 fL      Platelets 875 Thousands/uL      nRBC 0 /100 WBCs      Neutrophils Relative 56 %      Immat GRANS % 0 %      Lymphocytes Relative 29 %      Monocytes Relative 14 %      Eosinophils Relative 1 %      Basophils Relative 0 %      Neutrophils Absolute 1 97 Thousands/µL      Immature Grans Absolute 0 01 Thousand/uL      Lymphocytes Absolute 1 01 Thousands/µL      Monocytes Absolute 0 48 Thousand/µL      Eosinophils Absolute 0 02 Thousand/µL      Basophils Absolute 0 01 Thousands/µL                  US right upper quadrant   Final Result by Maribel Xavier MD (05/25 1746)      No acute findings  A 15 x 17 x 9 mm hyperechoic lesion in the anterior aspect of the right lobe is likely a benign hemangioma  This can be confirmed with nonurgent outpatient MRI if clinically warranted  Workstation performed: [id number]                    Procedures  Procedures         ED Course                             SBIRT 22yo+      Most Recent Value   SBIRT (24 yo +)   In order to provide better care to our patients, we are screening all of our patients for alcohol and drug use  Would it be okay to ask you these screening questions? Yes Filed at: 05/25/2021 1612   Initial Alcohol Screen: US AUDIT-C    1  How often do you have a drink containing alcohol?  0 Filed at: 05/25/2021 1612   2  How many drinks containing alcohol do you have on a typical day you are drinking? 0 Filed at: 05/25/2021 1612   3a  Male UNDER 65: How often do you have five or more drinks on one occasion? 0 Filed at: 05/25/2021 1612   3b  FEMALE Any Age, or MALE 65+: How often do you have 4 or more drinks on one occassion? 0 Filed at: 05/25/2021 1612   Audit-C Score  0 Filed at: 05/25/2021 1612   GREGORY: How many times in the past year have you    Used an illegal drug or used a prescription medication for non-medical reasons? Never Filed at: 05/25/2021 1612                    MDM  Number of Diagnoses or Management Options  Recurrent epigastric abdominal pain: new and requires workup  Diagnosis management comments: 33yo male presenting for recurrent epigastric abdominal pain that has been intermittent for the past several months  He denies any pattern to the pain  Does not seem to be related to eat but he admits to being under a lot of stress recently   Pain resolved spontaneously prior to arrival  He is afebrile and hemodynamically stable  No signs of peritonitis on abdominal exam  Differential diagnosis includes but is not limited to: gastritis, GERD, pancreatitis, hepatitis, cholecystitis, cholelithiasis, colitis, diverticulitis, appendicitis, mesenteric adenitis, UTI, pyelonephritis, SBO, constipation, kidney stone, musculoskeletal, nonspecific abdominal pain  Initial ED plan: Check abdominal labs and RUQ ultrasound  He declines medications at this time  Final assessment: Labs reveal a mild leukopenia with a WBC of 3 5  Remainder of labs unremarkable including normal electrolytes, LFTs, lipase, renal function  RUQ negative for acute findings  There is what appears to be a benign hemangioma noted  Patient was informed of this and provided a copy of his ultrasound results  He remains asymptomatic on re-evaluation  No indication for admission at this time  Advised Pepcid PRN at home  Gastroenterology referral provided for f/u  ED return precautions discussed  Patient expressed understanding and is agreeable to plan  Patient discharged in stable condition           Amount and/or Complexity of Data Reviewed  Clinical lab tests: ordered and reviewed  Tests in the radiology section of CPT®: ordered and reviewed  Review and summarize past medical records: yes  Independent visualization of images, tracings, or specimens: yes    Risk of Complications, Morbidity, and/or Mortality  Presenting problems: moderate  Diagnostic procedures: moderate  Management options: moderate    Patient Progress  Patient progress: stable      Disposition  Final diagnoses:   Recurrent epigastric abdominal pain     Time reflects when diagnosis was documented in both MDM as applicable and the Disposition within this note     Time User Action Codes Description Comment    5/25/2021  6:32 PM [address] [place] [address] Vanita [R10 [id number]] Recurrent epigastric abdominal pain       ED Disposition     ED Disposition Condition Date/Time Comment Discharge Stable Tue May 25, 2021  6:32 PM Erna Saleh discharge to home/self care  Follow-up Information     Follow up With Specialties Details Why Contact Info Additional [facility] Schedule an appointment as soon as possible for a visit   [address]  [address] [address]  [address] [facility], [address]  [address] [place], [place], [address] [place] [address] Emergency Department Emergency Medicine  If symptoms worsen [address] [facility] [address] [facility] Emergency Department, [address] [place], [place], [facility]          Discharge Medication List as of 5/25/2021  6:33 PM      CONTINUE these medications which have NOT CHANGED    Details   methylPREDNISolone 4 MG tablet therapy pack Use as directed on package, Print      naproxen (NAPROSYN) 500 mg tablet Take 1 tablet (500 mg total) by mouth 2 (two) times a day with meals Take scheduled for 5 days, then as needed for pain control , Starting Thu 4/22/2021, Print           No discharge procedures on file      PDMP Review     None          ED Provider  Electronically Signed by           Theresa Yin PA-C  05/25/21 8144

## 2021-06-02 ENCOUNTER — OFFICE VISIT (OUTPATIENT)
Dept: GASTROENTEROLOGY | Facility: CLINIC | Age: 31
End: 2021-06-02
Payer: COMMERCIAL

## 2021-06-02 VITALS
BODY MASS INDEX: 19.74 KG/M2 | HEART RATE: 72 BPM | SYSTOLIC BLOOD PRESSURE: 116 MMHG | HEIGHT: 71 IN | WEIGHT: 141 LBS | DIASTOLIC BLOOD PRESSURE: 70 MMHG

## 2021-06-02 DIAGNOSIS — R10.13 EPIGASTRIC PAIN: Primary | ICD-10-CM

## 2021-06-02 PROCEDURE — 99203 OFFICE O/P NEW LOW 30 MIN: CPT | Performed by: PHYSICIAN ASSISTANT

## 2021-06-02 RX ORDER — TERBINAFINE HYDROCHLORIDE 250 MG/1
TABLET ORAL
COMMUNITY
Start: 2021-02-24 | End: 2021-06-22 | Stop reason: ALTCHOICE

## 2021-06-02 RX ORDER — PANTOPRAZOLE SODIUM 40 MG/1
40 TABLET, DELAYED RELEASE ORAL DAILY
Qty: 30 TABLET | Refills: 3 | Status: SHIPPED | OUTPATIENT
Start: 2021-06-02 | End: 2021-06-28 | Stop reason: SDUPTHER

## 2021-06-02 NOTE — LETTER
6/2/2021    Dear Bambi Clemente,    Review of our records shows you are due for the following:    {GI Procedures/Services FOC:[id number]}    Please call the following office to schedule your appointment:    {GI OFFICE LOCATIONS:[id number]}    We look forward to hearing from you      Sincerely,

## 2021-06-02 NOTE — LETTER
June 2, 2021     Patient: Carlos Manuel Casillas   YOB: 1990   Date of Visit: 6/2/2021       To Whom it May Concern:    Carlos Manuel Casillas was seen in my clinic on 6/2/2021  He may return to work on 06/03/2021  If you have any questions or concerns, please don't hesitate to call           Sincerely,          Zahira Cruz PA-C        CC: No Recipients

## 2021-06-02 NOTE — PROGRESS NOTES
[facility] - Outpatient Consultation  Mario Davey 26 y o  male MRN: [id number]  Encounter: [id number]          ASSESSMENT AND PLAN:      1  Epigastric pain  He reports recurring and occasionally severe epigastric pain  He responded favorably to Pepcid  Will start Pantoprazole and schedule an EGD  US showed a small hemangioma of the liver    ______________________________________________________________________    HPI:    49-year-old male presents for evaluation of epigastric pain  He reports this has been recurring over the past several months  He went to the emergency room in February for evaluation  Ultrasound was performed which showed a small hemangioma in the liver but was otherwise unremarkable  He was started on an over-the-counter and acid which she notes did improve the symptoms but did not take it away completely  He admits that he drinks alcohol daily but does not drink a lot of alcohol  He admits to smoking marijuana on a daily basis  He denies any excessive NSAID use  He denies dysphagia, hematemesis or melena  He does have some nausea without vomiting  REVIEW OF SYSTEMS:    CONSTITUTIONAL: Denies any fever, chills, rigors, and weight loss  HEENT: No earache or tinnitus  Denies hearing loss or visual disturbances  CARDIOVASCULAR: No chest pain or palpitations  RESPIRATORY: Denies any cough, hemoptysis, shortness of breath or dyspnea on exertion  GASTROINTESTINAL: As noted in the History of Present Illness  GENITOURINARY: No problems with urination  Denies any hematuria or dysuria  NEUROLOGIC: No dizziness or vertigo, denies headaches  MUSCULOSKELETAL: Denies any muscle or joint pain  SKIN: Denies skin rashes or itching  ENDOCRINE: Denies excessive thirst  Denies intolerance to heat or cold  PSYCHOSOCIAL: Denies depression or anxiety  Denies any recent memory loss  Historical Information   History reviewed   No pertinent past medical history  History reviewed  No pertinent surgical history  Social History   Social History     Substance and Sexual Activity   Alcohol Use Yes    Drinks per session: 1 or 2    Comment: Pt reports drinking wine weekly and may have a beer a day     Social History     Substance and Sexual Activity   Drug Use Yes    Types: Marijuana    Comment: Pt reports smoking daily     Social History     Tobacco Use   Smoking Status Current Every Day Smoker    Packs/day: 0 25    Types: Cigars, Cigarettes   Smokeless Tobacco Never Used   Tobacco Comment    Pt reports "once in a while smokes cigarettes usually Black and Mild's"     Family History   Problem Relation Age of Onset    Diabetes Mother     Hypertension Mother     No Known Problems Father        Meds/Allergies       Current Outpatient Medications:     methylPREDNISolone 4 MG tablet therapy pack    naproxen (NAPROSYN) 500 mg tablet    pantoprazole (PROTONIX) 40 mg tablet    terbinafine (LamISIL) 250 mg tablet    No Known Allergies        Objective     Blood pressure 116/70, pulse 72, height 5' 11" (1 803 m), weight 64 kg (141 lb)  Body mass index is 19 67 kg/m²  PHYSICAL EXAM:      General Appearance:   Alert, cooperative, no distress   HEENT:   Normocephalic, atraumatic, anicteric      Neck:  Supple, symmetrical, trachea midline   Lungs:   Clear to auscultation bilaterally; no rales, rhonchi or wheezing; respirations unlabored    Heart[de-identified]   Regular rate and rhythm; no murmur, rub, or gallop  Abdomen:   Soft, non-tender, non-distended; normal bowel sounds; no masses, no organomegaly    Genitalia:   Deferred    Rectal:   Deferred    Extremities:  No cyanosis, clubbing or edema    Pulses:  2+ and symmetric    Skin:  No jaundice, rashes, or lesions    Lymph nodes:  No palpable cervical lymphadenopathy        Lab Results:   No visits with results within 1 Day(s) from this visit     Latest known visit with results is:   Admission on 05/25/2021, Discharged on 05/25/2021   Component Date Value    WBC 05/25/2021 3 50*    RBC 05/25/2021 4 66     Hemoglobin 05/25/2021 15 1     Hematocrit 05/25/2021 42 3     MCV 05/25/2021 91     MCH 05/25/2021 32 4     MCHC 05/25/2021 35 7     RDW 05/25/2021 12 3     MPV 05/25/2021 10 5     Platelets 82/57/2331 168     nRBC 05/25/2021 0     Neutrophils Relative 05/25/2021 56     Immat GRANS % 05/25/2021 0     Lymphocytes Relative 05/25/2021 29     Monocytes Relative 05/25/2021 14*    Eosinophils Relative 05/25/2021 1     Basophils Relative 05/25/2021 0     Neutrophils Absolute 05/25/2021 1 97     Immature Grans Absolute 05/25/2021 0 01     Lymphocytes Absolute 05/25/2021 1 01     Monocytes Absolute 05/25/2021 0 48     Eosinophils Absolute 05/25/2021 0 02     Basophils Absolute 05/25/2021 0 01     Sodium 05/25/2021 142     Potassium 05/25/2021 3 8     Chloride 05/25/2021 106     CO2 05/25/2021 30     ANION GAP 05/25/2021 6     BUN 05/25/2021 16     Creatinine 05/25/2021 1 23     Glucose 05/25/2021 116     Calcium 05/25/2021 8 4     AST 05/25/2021 10     ALT 05/25/2021 16     Alkaline Phosphatase 05/25/2021 62     Total Protein 05/25/2021 7 2     Albumin 05/25/2021 3 5     Total Bilirubin 05/25/2021 0 44     eGFR 05/25/2021 89     Lipase 05/25/2021 61*         Radiology Results:   Us Right Upper Quadrant    Result Date: 5/25/2021  Narrative: RIGHT UPPER QUADRANT ULTRASOUND INDICATION:     Intermittent RUQ and epigastric pain x several months  COMPARISON:  None TECHNIQUE:   Real-time ultrasound of the right upper quadrant was performed with a curvilinear transducer with both volumetric sweeps and still imaging techniques  FINDINGS: PANCREAS:  Visualized portions of the pancreas are within normal limits  AORTA AND IVC:  Visualized portions are normal for patient age  LIVER: Size:  Within normal range  The liver measures 13 cm in the midclavicular line  Contour:  Surface contour is smooth   Parenchyma: Echogenicity and echotexture are within normal limits  A 15 x 17 x 9 mm hyperechoic lesion in the anterior aspect of the right lobe is likely a benign hemangioma  Limited imaging of the main portal vein shows it to be patent and hepatopetal   BILIARY: No gallbladder findings  No intrahepatic biliary dilatation  CBD measures 3 mm  No choledocholithiasis  KIDNEY: Right kidney measures 9 4 x 4 8 x 4 4 cm  Within normal limits  ASCITES:   None  Impression: No acute findings  A 15 x 17 x 9 mm hyperechoic lesion in the anterior aspect of the right lobe is likely a benign hemangioma  This can be confirmed with nonurgent outpatient MRI if clinically warranted  Workstation performed: [id number]   Answers for HPI/ROS submitted by the patient on 6/2/2021   Abdominal pain  Chronicity: recurrent  Onset: more than 1 month ago  Onset quality: sudden  Frequency: 2 to 4 times per day  Episode duration: 5 days  Progression since onset: waxing and waning  Pain location: LUQ, RUQ, epigastric region  Pain - numeric: 9/10  Pain quality: aching, cramping, sharp  Radiates to: LUQ, RUQ, epigastric region  anorexia: Yes  arthralgias: No  belching: No  constipation: No  diarrhea: No  dysuria: No  fever: No  flatus: No  frequency: No  headaches: No  hematochezia: No  hematuria: No  melena: No  myalgias: No  nausea:  No  weight loss: No  vomiting: No  Aggravated by: certain positions  Relieved by: nothing  Diagnostic workup: ultrasound

## 2021-06-16 ENCOUNTER — TELEPHONE (OUTPATIENT)
Dept: GASTROENTEROLOGY | Facility: HOSPITAL | Age: 31
End: 2021-06-16

## 2021-06-21 ENCOUNTER — TELEPHONE (OUTPATIENT)
Dept: GASTROENTEROLOGY | Facility: HOSPITAL | Age: 31
End: 2021-06-21

## 2021-06-22 ENCOUNTER — ANESTHESIA EVENT (OUTPATIENT)
Dept: GASTROENTEROLOGY | Facility: HOSPITAL | Age: 31
End: 2021-06-22

## 2021-06-22 ENCOUNTER — HOSPITAL ENCOUNTER (OUTPATIENT)
Dept: GASTROENTEROLOGY | Facility: HOSPITAL | Age: 31
Setting detail: OUTPATIENT SURGERY
Discharge: HOME | End: 2021-06-22
Admitting: INTERNAL MEDICINE
Payer: COMMERCIAL

## 2021-06-22 ENCOUNTER — ANESTHESIA (OUTPATIENT)
Dept: GASTROENTEROLOGY | Facility: HOSPITAL | Age: 31
End: 2021-06-22

## 2021-06-22 VITALS
HEART RATE: 73 BPM | DIASTOLIC BLOOD PRESSURE: 75 MMHG | OXYGEN SATURATION: 99 % | BODY MASS INDEX: 19.85 KG/M2 | WEIGHT: 141.76 LBS | HEIGHT: 71 IN | TEMPERATURE: 98.9 F | SYSTOLIC BLOOD PRESSURE: 117 MMHG | RESPIRATION RATE: 16 BRPM

## 2021-06-22 DIAGNOSIS — R10.13 EPIGASTRIC PAIN: ICD-10-CM

## 2021-06-22 PROCEDURE — 88305 TISSUE EXAM BY PATHOLOGIST: CPT | Performed by: PATHOLOGY

## 2021-06-22 PROCEDURE — 43239 EGD BIOPSY SINGLE/MULTIPLE: CPT | Performed by: INTERNAL MEDICINE

## 2021-06-22 RX ORDER — LIDOCAINE HYDROCHLORIDE 20 MG/ML
INJECTION, SOLUTION EPIDURAL; INFILTRATION; INTRACAUDAL; PERINEURAL AS NEEDED
Status: DISCONTINUED | OUTPATIENT
Start: 2021-06-22 | End: 2021-06-22

## 2021-06-22 RX ORDER — SODIUM CHLORIDE, SODIUM LACTATE, POTASSIUM CHLORIDE, CALCIUM CHLORIDE 600; 310; 30; 20 MG/100ML; MG/100ML; MG/100ML; MG/100ML
125 INJECTION, SOLUTION INTRAVENOUS CONTINUOUS
Status: DISCONTINUED | OUTPATIENT
Start: 2021-06-22 | End: 2021-06-26 | Stop reason: HOSPADM

## 2021-06-22 RX ORDER — PROPOFOL 10 MG/ML
INJECTION, EMULSION INTRAVENOUS AS NEEDED
Status: DISCONTINUED | OUTPATIENT
Start: 2021-06-22 | End: 2021-06-22

## 2021-06-22 RX ORDER — SODIUM CHLORIDE, SODIUM LACTATE, POTASSIUM CHLORIDE, CALCIUM CHLORIDE 600; 310; 30; 20 MG/100ML; MG/100ML; MG/100ML; MG/100ML
INJECTION, SOLUTION INTRAVENOUS CONTINUOUS PRN
Status: DISCONTINUED | OUTPATIENT
Start: 2021-06-22 | End: 2021-06-22

## 2021-06-22 RX ADMIN — LIDOCAINE HYDROCHLORIDE 100 MG: 20 INJECTION, SOLUTION EPIDURAL; INFILTRATION; INTRACAUDAL; PERINEURAL at 10:58

## 2021-06-22 RX ADMIN — SODIUM CHLORIDE, SODIUM LACTATE, POTASSIUM CHLORIDE, AND CALCIUM CHLORIDE 125 ML/HR: .6; .31; .03; .02 INJECTION, SOLUTION INTRAVENOUS at 09:43

## 2021-06-22 RX ADMIN — PROPOFOL 150 MG: 10 INJECTION, EMULSION INTRAVENOUS at 11:02

## 2021-06-22 RX ADMIN — SODIUM CHLORIDE, SODIUM LACTATE, POTASSIUM CHLORIDE, AND CALCIUM CHLORIDE: .6; .31; .03; .02 INJECTION, SOLUTION INTRAVENOUS at 10:40

## 2021-06-22 RX ADMIN — PROPOFOL 50 MG: 10 INJECTION, EMULSION INTRAVENOUS at 11:04

## 2021-06-22 NOTE — ANESTHESIA POSTPROCEDURE EVALUATION
Post-Op Assessment Note    CV Status:  Stable  Pain Score: 0    Pain management: adequate     Mental Status:  Sleepy   Hydration Status:  Euvolemic   PONV Controlled:  Controlled   Airway Patency:  Patent      Post Op Vitals Reviewed: Yes      Staff: CRNA         No complications documented      BP   118/75   Temp      Pulse 80   Resp 18   SpO2 99% RA

## 2021-06-22 NOTE — ANESTHESIA PREPROCEDURE EVALUATION
Procedure:  EGD    Relevant Problems   No relevant active problems        Physical Exam    Airway    Mallampati score: II  TM Distance: >3 FB  Neck ROM: full     Dental       Cardiovascular  Cardiovascular exam normal    Pulmonary  Pulmonary exam normal     Other Findings        Anesthesia Plan  ASA Score- 2     Anesthesia Type- IV sedation with anesthesia with ASA Monitors  Additional Monitors:   Airway Plan:           Plan Factors-Exercise tolerance (METS): >4 METS  Chart reviewed  Existing labs reviewed  Patient summary reviewed  Induction- intravenous  Postoperative Plan-     Informed Consent- Anesthetic plan and risks discussed with patient  I personally reviewed this patient with the CRNA  Discussed and agreed on the Anesthesia Plan with the CRNA  Dara Soulier           HPI: Sony Gerardo is a 33y o  year old male who presents for evaluation of epigastric abdominal pain        REVIEW OF SYSTEMS: Per the HPI, and otherwise unremarkable

## 2021-06-22 NOTE — H&P
History and Physical -  [facility]  Pratibha Martini 33 y o  male MRN: [id number]      HPI: Pratibha Martini is a 33y o  year old male who presents for evaluation of epigastric abdominal pain      REVIEW OF SYSTEMS: Per the HPI, and otherwise unremarkable  Historical Information   Past Medical History:   Diagnosis Date    Bronchitis      Past Surgical History:   Procedure Laterality Date    NO PAST SURGERIES       Social History   Social History     Substance and Sexual Activity   Alcohol Use Yes    Comment: Pt reports drinking wine weekly and may have a beer a day     Social History     Substance and Sexual Activity   Drug Use Yes    Types: Marijuana    Comment: Pt reports smoking daily     Social History     Tobacco Use   Smoking Status Current Every Day Smoker    Packs/day: 0 25    Types: Cigars, Cigarettes   Smokeless Tobacco Never Used   Tobacco Comment    Pt reports "once in a while smokes cigarettes usually Black and Mild's"     Family History   Problem Relation Age of Onset    Diabetes Mother     Hypertension Mother     No Known Problems Father        Meds/Allergies     (Not in a hospital admission)      No Known Allergies    Objective     Blood pressure 126/79, pulse 80, temperature 98 4 °F (36 9 °C), temperature source Temporal, resp  rate 20, height 5' 11" (1 803 m), weight 64 3 kg (141 lb 12 1 oz), SpO2 99 %  PHYSICAL EXAM    Gen: NAD  CV: RRR  CHEST: Clear  ABD: soft, NT/ND  EXT: no edema      ASSESSMENT/PLAN:  This is a 33y o  year old male here for esophagogastroduodenoscopy with biopsies, and he is stable and optimized for his procedure

## 2021-06-25 DIAGNOSIS — K21.9 GASTROESOPHAGEAL REFLUX DISEASE, UNSPECIFIED WHETHER ESOPHAGITIS PRESENT: Primary | ICD-10-CM

## 2021-06-25 NOTE — TELEPHONE ENCOUNTER
[facility] patient - Patient would like a RTRN call to discuss results ([phone]) of procedure    Thx

## 2021-06-28 DIAGNOSIS — R10.13 EPIGASTRIC PAIN: ICD-10-CM

## 2021-06-28 RX ORDER — PANTOPRAZOLE SODIUM 40 MG/1
40 TABLET, DELAYED RELEASE ORAL 2 TIMES DAILY
Qty: 60 TABLET | Refills: 3 | Status: SHIPPED | OUTPATIENT
Start: 2021-06-28

## 2021-06-28 NOTE — TELEPHONE ENCOUNTER
Called pt, pt was confused over his findings on his mychart  Advised pt biopsy reports  Pt voiced understanding  Pt stated he still has pain  Pt is taking the protonix but later in the day he feels the pain and sometimes throws up  Pt said he can come in for a visit or have a phone visit because he has been missing a lot of work  Pt wants to know if Protonix can be twice a day    Routing to pa

## 2021-06-30 RX ORDER — PANTOPRAZOLE SODIUM 40 MG/1
40 TABLET, DELAYED RELEASE ORAL 2 TIMES DAILY
Qty: 180 TABLET | Refills: 3 | Status: SHIPPED | OUTPATIENT
Start: 2021-06-30